# Patient Record
Sex: MALE | Race: ASIAN | Employment: OTHER | URBAN - METROPOLITAN AREA
[De-identification: names, ages, dates, MRNs, and addresses within clinical notes are randomized per-mention and may not be internally consistent; named-entity substitution may affect disease eponyms.]

---

## 2018-06-19 ENCOUNTER — HOSPITAL ENCOUNTER (OUTPATIENT)
Dept: GENERAL RADIOLOGY | Age: 68
Discharge: HOME | End: 2018-06-19
Attending: FAMILY MEDICINE
Payer: MEDICARE

## 2018-06-19 DIAGNOSIS — K27.9 PEPTIC ULCER: ICD-10-CM

## 2018-06-19 PROCEDURE — 74011000250 HC RX REV CODE- 250

## 2018-06-19 PROCEDURE — 74240 X-RAY XM UPR GI TRC 1CNTRST: CPT

## 2018-06-19 PROCEDURE — 74011000255 HC RX REV CODE- 255

## 2018-06-19 RX ADMIN — BARIUM SULFATE 135 ML: 980 POWDER, FOR SUSPENSION ORAL at 09:00

## 2018-06-19 RX ADMIN — ANTACID/ANTIFLATULENT 4 G: 380; 550; 10; 10 GRANULE, EFFERVESCENT ORAL at 09:02

## 2018-06-19 RX ADMIN — BARIUM SULFATE 125 G: 960 POWDER, FOR SUSPENSION ORAL at 09:00

## 2018-07-02 ENCOUNTER — HOSPITAL ENCOUNTER (OUTPATIENT)
Dept: GENERAL RADIOLOGY | Age: 68
Discharge: HOME | End: 2018-07-02
Attending: FAMILY MEDICINE

## 2018-07-02 DIAGNOSIS — K27.9 PEPTIC ULCER: ICD-10-CM

## 2018-07-12 ENCOUNTER — HOSPITAL ENCOUNTER (OUTPATIENT)
Dept: GENERAL RADIOLOGY | Age: 68
Discharge: HOME | End: 2018-07-12
Attending: FAMILY MEDICINE
Payer: MEDICARE

## 2018-07-12 ENCOUNTER — HOSPITAL ENCOUNTER (OUTPATIENT)
Dept: PHYSICAL THERAPY | Age: 68
Discharge: HOME | End: 2018-07-12
Attending: FAMILY MEDICINE
Payer: MEDICARE

## 2018-07-12 DIAGNOSIS — R13.10 DYSPHAGIA: ICD-10-CM

## 2018-07-12 PROCEDURE — G8997 SWALLOW GOAL STATUS: HCPCS

## 2018-07-12 PROCEDURE — 92611 MOTION FLUOROSCOPY/SWALLOW: CPT

## 2018-07-12 PROCEDURE — G8996 SWALLOW CURRENT STATUS: HCPCS

## 2018-07-12 PROCEDURE — G8998 SWALLOW D/C STATUS: HCPCS

## 2018-07-12 PROCEDURE — 74011000255 HC RX REV CODE- 255

## 2018-07-12 PROCEDURE — 74230 X-RAY XM SWLNG FUNCJ C+: CPT

## 2018-07-12 RX ADMIN — BARIUM SULFATE 700 MG: 700 TABLET ORAL at 08:51

## 2018-07-12 RX ADMIN — BARIUM SULFATE 70 G: 960 POWDER, FOR SUSPENSION ORAL at 08:51

## 2018-07-12 RX ADMIN — BARIUM SULFATE 10 ML: 400 PASTE ORAL at 08:50

## 2018-07-12 NOTE — PROGRESS NOTES
[facility]  [address] [facility], [address]  Ph: [phone]    Fax: [phone]    Outpatient Modified Barium Swallow Evaluation    Patient: Renee Calvo (73 y.o. male)  Date: 7/12/2018  Primary Diagnosis: Dysphagia, unspecified [R13.10]        Precautions: Aspiration       Radiologist: HRRA    History: History of peptic ulcer disease. Patient completed XR upper GI 6/19; results indicated silent aspiration. Video Flouroscopic Procedures  [x] Lateral View   [] A-P View [] Scanned to level of Sternum    [x] Seated at 90 deg.   [] Other:    Presentation:    [x] Spoon   [] Cup   [x] Straw   [] Syringe   [] Consecutive Swallows  [] Other:    Consistencies:   [x] Ba+ liquid   [] Ba+ liquid (nectar)   [] Ba+ liquid (honey)   [x] Ba+ puree [x] Ba+ cookie [x] 13 mm Ba pill with thin Ba wash    Testing Discontinued:   [] Due to:    Treatment Techniques Attempted  [] Head Turn: [] Right [] Left     [] Head Tilt: [] Right [] Left     [] Chin Down:  [x] Small Sips/bites:  [x] Effortful swallow:  [] Double swallow:  [] Other:    Results  Dysphagia Present:     [x] Yes  [] No    Ratings of Dysphagia:     [x] Mild  [] Moderate  [] Severe    Stages of Breakdown:   [] Oral  [x] Pharyngeal  [] Esophageal    Aspiration:   [] Yes    [x] No  [] At Risk     Cough: [] Yes      [] No     Penetration:   [x] Yes    [] No     Cough: [] Yes      [] No   [x] Flash/trace   [] Mod   [] To Chords          Consistency Aspirated:   Consistency Penetrated:   [] Thin Liquid     [x] Thin Liquid  [] Nectar-thick Liquid    [] Nectar-thick Liquid   [] Honey-thick Liquid    [] Honey-thick Liquid   [] Puree     [] Puree  [] Solid     [] Solid  [] 13 mm Ba pill with thin   [x] 13 mm Ba pill with thin     Motility Problems with:  [] Lip Closure:    [] Mastication:   [] Bolus Formation/control:   [] A-P Transport:  [] Tongue Base Retraction:  [] Swallow Response (delayed):  [] Velopharyngeal Closure:  [] Pharyngeal Aspirations:  [x] Laryngeal Elevation/adduction: mild  [] Epiglottic Inversion:  [] Pharyngeal motility/sensation:  [] Cricopharyngeal Relaxation:  [] Esophageal Peristalsis:  [] Other:    Timing of Aspiration/Penetration:  [] Before Swallow:  [x] During Swallow: with thin Ba  [] After Swallow:    Transit Time Delay:  [] >1 Second  Oral  [] >1 Second Pharyngeal  [] >20 Second Esophageal     Residuals:  [] Vallecula:    [] Mild  [] Mod  [] Severe  [] Pyriform Sinus:   [] Mild  [] Mod  [] Severe  [] Posterior Pharyngeal Wall:  [] Mild  [] Mod  [] Severe    GCODESwallowing:  Swallow Current Status CH= 0%   Swallow Goal Status CH= 0%   Swallow D/C Status CH= 0%    The severity rating is based on the following outcomes:    National Outcomes Measures (NOMS) - LIBAN Noms Swallow Level 7  8-point Penetration-Aspiration Scale - Score 2  Professional Judgement      Videofluoroscopy Results/Recommendations:  Modified Barium Swallow completed with 0 aspiration evident across all study trials, to include: thin liquids; pudding; cracker; and 13 mm barium pill with thin liquid wash. Pt demo: (+) bolus cohesion, manipulation, and propulsion; (+) swallow reflex; and (+) hyolaryngeal excursion. Patient with trace penetration of thin liquid + straw and with thin liquid wash used with 13 mm Ba+ tablet. Penetrated liquid cleared without cough. Deficits include hyolaryngeal elevation/adduction. Mild pharyngeal dysphagia evident at this time.       Skip Becker M.S., CF-SLP  Speech Language Pathologist